# Patient Record
Sex: MALE | Race: ASIAN | NOT HISPANIC OR LATINO | Employment: FULL TIME | URBAN - METROPOLITAN AREA
[De-identification: names, ages, dates, MRNs, and addresses within clinical notes are randomized per-mention and may not be internally consistent; named-entity substitution may affect disease eponyms.]

---

## 2017-06-28 ENCOUNTER — GENERIC CONVERSION - ENCOUNTER (OUTPATIENT)
Dept: OTHER | Facility: OTHER | Age: 32
End: 2017-06-28

## 2017-06-28 ENCOUNTER — HOSPITAL ENCOUNTER (INPATIENT)
Facility: HOSPITAL | Age: 32
LOS: 2 days | Discharge: HOME | DRG: 025 | End: 2017-06-30
Attending: GENERAL PRACTICE | Admitting: GENERAL PRACTICE
Payer: OTHER MISCELLANEOUS

## 2017-06-28 ENCOUNTER — APPOINTMENT (EMERGENCY)
Dept: CT IMAGING | Facility: HOSPITAL | Age: 32
DRG: 025 | End: 2017-06-28
Payer: OTHER MISCELLANEOUS

## 2017-06-28 ENCOUNTER — APPOINTMENT (EMERGENCY)
Dept: RADIOLOGY | Facility: HOSPITAL | Age: 32
DRG: 025 | End: 2017-06-28
Payer: OTHER MISCELLANEOUS

## 2017-06-28 DIAGNOSIS — G44.309 HEADACHE DUE TO OLD CONCUSSION (HCC): ICD-10-CM

## 2017-06-28 DIAGNOSIS — R55 SYNCOPE, UNSPECIFIED SYNCOPE TYPE: ICD-10-CM

## 2017-06-28 DIAGNOSIS — R55 SYNCOPE: Primary | ICD-10-CM

## 2017-06-28 DIAGNOSIS — S06.0X9S HEADACHE DUE TO OLD CONCUSSION (HCC): ICD-10-CM

## 2017-06-28 PROBLEM — S06.0XAS HEADACHE DUE TO OLD CONCUSSION (HCC): Status: ACTIVE | Noted: 2017-06-28

## 2017-06-28 PROBLEM — S30.0XXA CONTUSION OF LOWER BACK: Status: ACTIVE | Noted: 2017-06-28

## 2017-06-28 LAB
ALBUMIN SERPL BCP-MCNC: 3.9 G/DL (ref 3.5–5)
ALP SERPL-CCNC: 64 U/L (ref 46–116)
ALT SERPL W P-5'-P-CCNC: 96 U/L (ref 12–78)
ANION GAP SERPL CALCULATED.3IONS-SCNC: 6 MMOL/L (ref 4–13)
AST SERPL W P-5'-P-CCNC: 55 U/L (ref 5–45)
BASOPHILS # BLD AUTO: 0.05 THOUSANDS/ΜL (ref 0–0.1)
BASOPHILS NFR BLD AUTO: 1 % (ref 0–1)
BILIRUB SERPL-MCNC: 0.2 MG/DL (ref 0.2–1)
BUN SERPL-MCNC: 15 MG/DL (ref 5–25)
CALCIUM SERPL-MCNC: 9.1 MG/DL (ref 8.3–10.1)
CHLORIDE SERPL-SCNC: 103 MMOL/L (ref 100–108)
CO2 SERPL-SCNC: 32 MMOL/L (ref 21–32)
CREAT SERPL-MCNC: 0.94 MG/DL (ref 0.6–1.3)
EOSINOPHIL # BLD AUTO: 0.22 THOUSAND/ΜL (ref 0–0.61)
EOSINOPHIL NFR BLD AUTO: 3 % (ref 0–6)
ERYTHROCYTE [DISTWIDTH] IN BLOOD BY AUTOMATED COUNT: 14.2 % (ref 11.6–15.1)
GFR SERPL CREATININE-BSD FRML MDRD: >60 ML/MIN/1.73SQ M
GLUCOSE SERPL-MCNC: 101 MG/DL (ref 65–140)
HCT VFR BLD AUTO: 43.1 % (ref 36.5–49.3)
HGB BLD-MCNC: 13.9 G/DL (ref 12–17)
LYMPHOCYTES # BLD AUTO: 2.14 THOUSANDS/ΜL (ref 0.6–4.47)
LYMPHOCYTES NFR BLD AUTO: 27 % (ref 14–44)
MCH RBC QN AUTO: 25.9 PG (ref 26.8–34.3)
MCHC RBC AUTO-ENTMCNC: 32.3 G/DL (ref 31.4–37.4)
MCV RBC AUTO: 80 FL (ref 82–98)
MONOCYTES # BLD AUTO: 0.56 THOUSAND/ΜL (ref 0.17–1.22)
MONOCYTES NFR BLD AUTO: 7 % (ref 4–12)
NEUTROPHILS # BLD AUTO: 4.97 THOUSANDS/ΜL (ref 1.85–7.62)
NEUTS SEG NFR BLD AUTO: 62 % (ref 43–75)
NRBC BLD AUTO-RTO: 0 /100 WBCS
PLATELET # BLD AUTO: 277 THOUSANDS/UL (ref 149–390)
PMV BLD AUTO: 9.5 FL (ref 8.9–12.7)
POTASSIUM SERPL-SCNC: 4.1 MMOL/L (ref 3.5–5.3)
PROT SERPL-MCNC: 7.6 G/DL (ref 6.4–8.2)
RBC # BLD AUTO: 5.37 MILLION/UL (ref 3.88–5.62)
SODIUM SERPL-SCNC: 141 MMOL/L (ref 136–145)
TROPONIN I SERPL-MCNC: <0.02 NG/ML
WBC # BLD AUTO: 7.97 THOUSAND/UL (ref 4.31–10.16)

## 2017-06-28 PROCEDURE — 71020 HB CHEST X-RAY 2VW FRONTAL&LATL: CPT | Performed by: GENERAL PRACTICE

## 2017-06-28 PROCEDURE — 70496 CT ANGIOGRAPHY HEAD: CPT

## 2017-06-28 PROCEDURE — 80053 COMPREHEN METABOLIC PANEL: CPT | Performed by: GENERAL PRACTICE

## 2017-06-28 PROCEDURE — 84484 ASSAY OF TROPONIN QUANT: CPT | Performed by: GENERAL PRACTICE

## 2017-06-28 PROCEDURE — 36415 COLL VENOUS BLD VENIPUNCTURE: CPT

## 2017-06-28 PROCEDURE — 93005 ELECTROCARDIOGRAM TRACING: CPT | Performed by: GENERAL PRACTICE

## 2017-06-28 PROCEDURE — 96374 THER/PROPH/DIAG INJ IV PUSH: CPT

## 2017-06-28 PROCEDURE — 85025 COMPLETE CBC W/AUTO DIFF WBC: CPT | Performed by: GENERAL PRACTICE

## 2017-06-28 PROCEDURE — 93005 ELECTROCARDIOGRAM TRACING: CPT | Performed by: NURSE PRACTITIONER

## 2017-06-28 PROCEDURE — 70498 CT ANGIOGRAPHY NECK: CPT

## 2017-06-28 PROCEDURE — 74177 CT ABD & PELVIS W/CONTRAST: CPT

## 2017-06-28 RX ORDER — SODIUM CHLORIDE 9 MG/ML
75 INJECTION, SOLUTION INTRAVENOUS CONTINUOUS
Status: DISCONTINUED | OUTPATIENT
Start: 2017-06-28 | End: 2017-06-30

## 2017-06-28 RX ORDER — ONDANSETRON 2 MG/ML
4 INJECTION INTRAMUSCULAR; INTRAVENOUS ONCE
Status: COMPLETED | OUTPATIENT
Start: 2017-06-28 | End: 2017-06-28

## 2017-06-28 RX ORDER — ONDANSETRON 2 MG/ML
INJECTION INTRAMUSCULAR; INTRAVENOUS
Status: COMPLETED
Start: 2017-06-28 | End: 2017-06-28

## 2017-06-28 RX ORDER — ONDANSETRON 4 MG/1
4 TABLET, ORALLY DISINTEGRATING ORAL EVERY 8 HOURS PRN
Status: ON HOLD | COMMUNITY
End: 2017-06-30

## 2017-06-28 RX ORDER — ACETAMINOPHEN 325 MG/1
650 TABLET ORAL EVERY 6 HOURS PRN
COMMUNITY

## 2017-06-28 RX ORDER — ONDANSETRON 4 MG/1
4 TABLET, ORALLY DISINTEGRATING ORAL EVERY 8 HOURS PRN
Status: DISCONTINUED | OUTPATIENT
Start: 2017-06-28 | End: 2017-06-29

## 2017-06-28 RX ORDER — ACETAMINOPHEN 325 MG/1
650 TABLET ORAL EVERY 6 HOURS PRN
Status: DISCONTINUED | OUTPATIENT
Start: 2017-06-28 | End: 2017-06-29

## 2017-06-28 RX ORDER — ACETAMINOPHEN 325 MG/1
650 TABLET ORAL EVERY 6 HOURS PRN
Status: DISCONTINUED | OUTPATIENT
Start: 2017-06-28 | End: 2017-06-28

## 2017-06-28 RX ADMIN — ACETAMINOPHEN 650 MG: 325 TABLET ORAL at 21:02

## 2017-06-28 RX ADMIN — IOHEXOL 100 ML: 350 INJECTION, SOLUTION INTRAVENOUS at 15:49

## 2017-06-28 RX ADMIN — SODIUM CHLORIDE 75 ML/HR: 0.9 INJECTION, SOLUTION INTRAVENOUS at 21:04

## 2017-06-28 RX ADMIN — ONDANSETRON 4 MG: 2 INJECTION INTRAMUSCULAR; INTRAVENOUS at 21:02

## 2017-06-28 RX ADMIN — ONDANSETRON 4 MG: 2 INJECTION INTRAMUSCULAR; INTRAVENOUS at 16:09

## 2017-06-29 ENCOUNTER — APPOINTMENT (INPATIENT)
Dept: NON INVASIVE DIAGNOSTICS | Facility: HOSPITAL | Age: 32
DRG: 025 | End: 2017-06-29
Payer: OTHER MISCELLANEOUS

## 2017-06-29 ENCOUNTER — APPOINTMENT (INPATIENT)
Dept: MRI IMAGING | Facility: HOSPITAL | Age: 32
DRG: 025 | End: 2017-06-29
Payer: OTHER MISCELLANEOUS

## 2017-06-29 ENCOUNTER — GENERIC CONVERSION - ENCOUNTER (OUTPATIENT)
Dept: OTHER | Facility: OTHER | Age: 32
End: 2017-06-29

## 2017-06-29 ENCOUNTER — APPOINTMENT (INPATIENT)
Dept: NEUROLOGY | Facility: HOSPITAL | Age: 32
DRG: 025 | End: 2017-06-29
Attending: PSYCHIATRY & NEUROLOGY
Payer: OTHER MISCELLANEOUS

## 2017-06-29 LAB
ALBUMIN SERPL BCP-MCNC: 3.3 G/DL (ref 3.5–5)
ALP SERPL-CCNC: 56 U/L (ref 46–116)
ALT SERPL W P-5'-P-CCNC: 74 U/L (ref 12–78)
ANION GAP SERPL CALCULATED.3IONS-SCNC: 9 MMOL/L (ref 4–13)
AST SERPL W P-5'-P-CCNC: 38 U/L (ref 5–45)
ATRIAL RATE: 84 BPM
BILIRUB DIRECT SERPL-MCNC: 0.05 MG/DL (ref 0–0.2)
BILIRUB SERPL-MCNC: 0.2 MG/DL (ref 0.2–1)
BUN SERPL-MCNC: 10 MG/DL (ref 5–25)
CALCIUM SERPL-MCNC: 8.8 MG/DL (ref 8.3–10.1)
CHLORIDE SERPL-SCNC: 103 MMOL/L (ref 100–108)
CO2 SERPL-SCNC: 27 MMOL/L (ref 21–32)
CREAT SERPL-MCNC: 0.87 MG/DL (ref 0.6–1.3)
ERYTHROCYTE [DISTWIDTH] IN BLOOD BY AUTOMATED COUNT: 14.3 % (ref 11.6–15.1)
GFR SERPL CREATININE-BSD FRML MDRD: >60 ML/MIN/1.73SQ M
GLUCOSE SERPL-MCNC: 110 MG/DL (ref 65–140)
HCT VFR BLD AUTO: 40 % (ref 36.5–49.3)
HGB BLD-MCNC: 13 G/DL (ref 12–17)
MAGNESIUM SERPL-MCNC: 1.8 MG/DL (ref 1.6–2.6)
MCH RBC QN AUTO: 26 PG (ref 26.8–34.3)
MCHC RBC AUTO-ENTMCNC: 32.5 G/DL (ref 31.4–37.4)
MCV RBC AUTO: 80 FL (ref 82–98)
P AXIS: 64 DEGREES
PLATELET # BLD AUTO: 240 THOUSANDS/UL (ref 149–390)
PMV BLD AUTO: 9.5 FL (ref 8.9–12.7)
POTASSIUM SERPL-SCNC: 3.9 MMOL/L (ref 3.5–5.3)
PR INTERVAL: 164 MS
PROT SERPL-MCNC: 6.8 G/DL (ref 6.4–8.2)
QRS AXIS: 66 DEGREES
QRSD INTERVAL: 90 MS
QT INTERVAL: 352 MS
QTC INTERVAL: 415 MS
RBC # BLD AUTO: 5 MILLION/UL (ref 3.88–5.62)
SODIUM SERPL-SCNC: 139 MMOL/L (ref 136–145)
T WAVE AXIS: 44 DEGREES
TROPONIN I SERPL-MCNC: <0.02 NG/ML
TSH SERPL DL<=0.05 MIU/L-ACNC: 1.04 UIU/ML (ref 0.36–3.74)
VENTRICULAR RATE: 84 BPM
WBC # BLD AUTO: 4.69 THOUSAND/UL (ref 4.31–10.16)

## 2017-06-29 PROCEDURE — 83735 ASSAY OF MAGNESIUM: CPT | Performed by: NURSE PRACTITIONER

## 2017-06-29 PROCEDURE — 80048 BASIC METABOLIC PNL TOTAL CA: CPT | Performed by: NURSE PRACTITIONER

## 2017-06-29 PROCEDURE — 84484 ASSAY OF TROPONIN QUANT: CPT | Performed by: PHYSICIAN ASSISTANT

## 2017-06-29 PROCEDURE — 93306 TTE W/DOPPLER COMPLETE: CPT

## 2017-06-29 PROCEDURE — 70551 MRI BRAIN STEM W/O DYE: CPT

## 2017-06-29 PROCEDURE — 80076 HEPATIC FUNCTION PANEL: CPT | Performed by: GENERAL PRACTICE

## 2017-06-29 PROCEDURE — 95816 EEG AWAKE AND DROWSY: CPT

## 2017-06-29 PROCEDURE — 84443 ASSAY THYROID STIM HORMONE: CPT | Performed by: NURSE PRACTITIONER

## 2017-06-29 PROCEDURE — 85027 COMPLETE CBC AUTOMATED: CPT | Performed by: NURSE PRACTITIONER

## 2017-06-29 PROCEDURE — 36415 COLL VENOUS BLD VENIPUNCTURE: CPT | Performed by: NURSE PRACTITIONER

## 2017-06-29 PROCEDURE — 99285 EMERGENCY DEPT VISIT HI MDM: CPT

## 2017-06-29 RX ORDER — ONDANSETRON 4 MG/1
4 TABLET, ORALLY DISINTEGRATING ORAL EVERY 6 HOURS PRN
Status: DISCONTINUED | OUTPATIENT
Start: 2017-06-29 | End: 2017-06-30 | Stop reason: HOSPADM

## 2017-06-29 RX ORDER — MORPHINE SULFATE 2 MG/ML
1 INJECTION, SOLUTION INTRAMUSCULAR; INTRAVENOUS EVERY 4 HOURS PRN
Status: DISCONTINUED | OUTPATIENT
Start: 2017-06-29 | End: 2017-06-30 | Stop reason: HOSPADM

## 2017-06-29 RX ORDER — BUTALBITAL, ACETAMINOPHEN AND CAFFEINE 50; 325; 40 MG/1; MG/1; MG/1
1 TABLET ORAL ONCE
Status: COMPLETED | OUTPATIENT
Start: 2017-06-29 | End: 2017-06-29

## 2017-06-29 RX ADMIN — ENOXAPARIN SODIUM 40 MG: 40 INJECTION SUBCUTANEOUS at 09:29

## 2017-06-29 RX ADMIN — ONDANSETRON 4 MG: 4 TABLET, ORALLY DISINTEGRATING ORAL at 23:20

## 2017-06-29 RX ADMIN — BUTALBITAL, ACETAMINOPHEN, AND CAFFEINE 1 TABLET: 50; 325; 40 TABLET ORAL at 06:18

## 2017-06-29 RX ADMIN — ONDANSETRON 4 MG: 4 TABLET, ORALLY DISINTEGRATING ORAL at 15:46

## 2017-06-29 RX ADMIN — MORPHINE SULFATE 1 MG: 2 INJECTION, SOLUTION INTRAMUSCULAR; INTRAVENOUS at 20:17

## 2017-06-29 RX ADMIN — MORPHINE SULFATE 1 MG: 2 INJECTION, SOLUTION INTRAMUSCULAR; INTRAVENOUS at 11:47

## 2017-06-29 RX ADMIN — SODIUM CHLORIDE 75 ML/HR: 0.9 INJECTION, SOLUTION INTRAVENOUS at 23:19

## 2017-06-29 RX ADMIN — MORPHINE SULFATE 1 MG: 2 INJECTION, SOLUTION INTRAMUSCULAR; INTRAVENOUS at 16:30

## 2017-06-29 RX ADMIN — ONDANSETRON 4 MG: 4 TABLET, ORALLY DISINTEGRATING ORAL at 09:39

## 2017-06-30 ENCOUNTER — APPOINTMENT (INPATIENT)
Dept: NUCLEAR MEDICINE | Facility: HOSPITAL | Age: 32
DRG: 025 | End: 2017-06-30
Payer: OTHER MISCELLANEOUS

## 2017-06-30 ENCOUNTER — APPOINTMENT (INPATIENT)
Dept: NON INVASIVE DIAGNOSTICS | Facility: HOSPITAL | Age: 32
DRG: 025 | End: 2017-06-30
Payer: OTHER MISCELLANEOUS

## 2017-06-30 ENCOUNTER — GENERIC CONVERSION - ENCOUNTER (OUTPATIENT)
Dept: OTHER | Facility: OTHER | Age: 32
End: 2017-06-30

## 2017-06-30 VITALS
WEIGHT: 220 LBS | HEIGHT: 70 IN | TEMPERATURE: 97.9 F | SYSTOLIC BLOOD PRESSURE: 133 MMHG | HEART RATE: 88 BPM | RESPIRATION RATE: 17 BRPM | DIASTOLIC BLOOD PRESSURE: 92 MMHG | OXYGEN SATURATION: 95 % | BODY MASS INDEX: 31.5 KG/M2

## 2017-06-30 LAB
MAX DIASTOLIC BP: 80 MMHG
MAX HEART RATE: 122 BPM
MAX PREDICTED HEART RATE: 188 BPM
MAX. SYSTOLIC BP: 124 MMHG
PROTOCOL NAME: NORMAL
REASON FOR TERMINATION: NORMAL
TARGET HR FORMULA: NORMAL
TEST INDICATION: NORMAL
TIME IN EXERCISE PHASE: 180 S

## 2017-06-30 PROCEDURE — 78452 HT MUSCLE IMAGE SPECT MULT: CPT

## 2017-06-30 PROCEDURE — A9502 TC99M TETROFOSMIN: HCPCS

## 2017-06-30 PROCEDURE — 93017 CV STRESS TEST TRACING ONLY: CPT

## 2017-06-30 RX ORDER — SUMATRIPTAN 100 MG/1
100 TABLET, FILM COATED ORAL ONCE AS NEEDED
Qty: 10 TABLET | Refills: 0 | Status: SHIPPED | OUTPATIENT
Start: 2017-06-30 | End: 2018-09-11 | Stop reason: HOSPADM

## 2017-06-30 RX ORDER — SUMATRIPTAN 25 MG/1
100 TABLET, FILM COATED ORAL ONCE AS NEEDED
Status: COMPLETED | OUTPATIENT
Start: 2017-06-30 | End: 2017-06-30

## 2017-06-30 RX ORDER — BUTALBITAL, ACETAMINOPHEN AND CAFFEINE 50; 325; 40 MG/1; MG/1; MG/1
1 TABLET ORAL EVERY 6 HOURS PRN
Qty: 10 TABLET | Refills: 0 | Status: SHIPPED | OUTPATIENT
Start: 2017-06-30 | End: 2018-09-11 | Stop reason: HOSPADM

## 2017-06-30 RX ORDER — ONDANSETRON 4 MG/1
4 TABLET, ORALLY DISINTEGRATING ORAL EVERY 8 HOURS PRN
Qty: 20 TABLET | Refills: 0 | Status: SHIPPED | OUTPATIENT
Start: 2017-06-30 | End: 2018-09-11 | Stop reason: HOSPADM

## 2017-06-30 RX ADMIN — MORPHINE SULFATE 1 MG: 2 INJECTION, SOLUTION INTRAMUSCULAR; INTRAVENOUS at 07:37

## 2017-06-30 RX ADMIN — SODIUM CHLORIDE 75 ML/HR: 0.9 INJECTION, SOLUTION INTRAVENOUS at 12:20

## 2017-06-30 RX ADMIN — MORPHINE SULFATE 1 MG: 2 INJECTION, SOLUTION INTRAMUSCULAR; INTRAVENOUS at 01:33

## 2017-06-30 RX ADMIN — ENOXAPARIN SODIUM 40 MG: 40 INJECTION SUBCUTANEOUS at 12:21

## 2017-06-30 RX ADMIN — SUMATRIPTAN SUCCINATE 100 MG: 25 TABLET, FILM COATED ORAL at 12:20

## 2017-06-30 RX ADMIN — REGADENOSON 0.4 MG: 0.08 INJECTION, SOLUTION INTRAVENOUS at 10:01

## 2017-06-30 RX ADMIN — MORPHINE SULFATE 1 MG: 2 INJECTION, SOLUTION INTRAMUSCULAR; INTRAVENOUS at 12:20

## 2017-07-06 ENCOUNTER — TRANSCRIBE ORDERS (OUTPATIENT)
Dept: ADMINISTRATIVE | Facility: HOSPITAL | Age: 32
End: 2017-07-06

## 2017-07-06 ENCOUNTER — ALLSCRIPTS OFFICE VISIT (OUTPATIENT)
Dept: OTHER | Facility: OTHER | Age: 32
End: 2017-07-06

## 2017-07-06 DIAGNOSIS — M54.12 BRACHIAL NEURITIS OR RADICULITIS NOS: Primary | ICD-10-CM

## 2017-07-06 DIAGNOSIS — R55 SYNCOPE AND COLLAPSE: ICD-10-CM

## 2017-07-06 DIAGNOSIS — F07.81 POSTCONCUSSIONAL SYNDROME: ICD-10-CM

## 2017-07-06 DIAGNOSIS — M54.12 RADICULOPATHY OF CERVICAL REGION: ICD-10-CM

## 2017-07-06 DIAGNOSIS — R55 SYNCOPE AND COLLAPSE: Primary | ICD-10-CM

## 2017-07-06 DIAGNOSIS — M54.16 RADICULOPATHY OF LUMBAR REGION: ICD-10-CM

## 2017-07-10 ENCOUNTER — ALLSCRIPTS OFFICE VISIT (OUTPATIENT)
Dept: OTHER | Facility: OTHER | Age: 32
End: 2017-07-10

## 2017-07-12 ENCOUNTER — TRANSCRIBE ORDERS (OUTPATIENT)
Dept: ADMINISTRATIVE | Facility: HOSPITAL | Age: 32
End: 2017-07-12

## 2017-07-12 DIAGNOSIS — M54.12 BRACHIAL NEURITIS OR RADICULITIS NOS: Primary | ICD-10-CM

## 2017-07-13 ENCOUNTER — GENERIC CONVERSION - ENCOUNTER (OUTPATIENT)
Dept: OTHER | Facility: OTHER | Age: 32
End: 2017-07-13

## 2017-07-20 ENCOUNTER — ALLSCRIPTS OFFICE VISIT (OUTPATIENT)
Dept: OTHER | Facility: OTHER | Age: 32
End: 2017-07-20

## 2017-07-21 ENCOUNTER — HOSPITAL ENCOUNTER (OUTPATIENT)
Dept: MRI IMAGING | Facility: HOSPITAL | Age: 32
Discharge: HOME | End: 2017-07-21
Attending: PSYCHIATRY & NEUROLOGY
Payer: OTHER MISCELLANEOUS

## 2017-07-21 DIAGNOSIS — M54.12 RADICULOPATHY OF CERVICAL REGION: ICD-10-CM

## 2017-07-21 DIAGNOSIS — M54.16 RADICULOPATHY OF LUMBAR REGION: ICD-10-CM

## 2017-07-21 PROCEDURE — 72148 MRI LUMBAR SPINE W/O DYE: CPT

## 2017-07-21 PROCEDURE — 72141 MRI NECK SPINE W/O DYE: CPT

## 2017-07-24 ENCOUNTER — HOSPITAL ENCOUNTER (OUTPATIENT)
Dept: NON INVASIVE DIAGNOSTICS | Facility: CLINIC | Age: 32
Discharge: HOME | End: 2017-07-24
Payer: COMMERCIAL

## 2017-07-24 DIAGNOSIS — R55 SYNCOPE AND COLLAPSE: ICD-10-CM

## 2017-08-08 ENCOUNTER — HOSPITAL ENCOUNTER (OUTPATIENT)
Dept: NEUROLOGY | Facility: HOSPITAL | Age: 32
Discharge: HOME | End: 2017-08-08
Attending: PSYCHIATRY & NEUROLOGY
Payer: OTHER MISCELLANEOUS

## 2017-08-08 DIAGNOSIS — R55 SYNCOPE, UNSPECIFIED SYNCOPE TYPE: ICD-10-CM

## 2017-08-08 DIAGNOSIS — R55 SYNCOPE AND COLLAPSE: ICD-10-CM

## 2017-08-08 PROCEDURE — 95819 EEG AWAKE AND ASLEEP: CPT

## 2017-08-24 ENCOUNTER — ALLSCRIPTS OFFICE VISIT (OUTPATIENT)
Dept: OTHER | Facility: OTHER | Age: 32
End: 2017-08-24

## 2017-08-28 ENCOUNTER — ALLSCRIPTS OFFICE VISIT (OUTPATIENT)
Dept: OTHER | Facility: OTHER | Age: 32
End: 2017-08-28

## 2017-08-31 ENCOUNTER — GENERIC CONVERSION - ENCOUNTER (OUTPATIENT)
Dept: OTHER | Facility: OTHER | Age: 32
End: 2017-08-31

## 2017-09-01 ENCOUNTER — ALLSCRIPTS OFFICE VISIT (OUTPATIENT)
Dept: OTHER | Facility: OTHER | Age: 32
End: 2017-09-01

## 2017-09-01 DIAGNOSIS — Z00.00 ENCOUNTER FOR GENERAL ADULT MEDICAL EXAMINATION WITHOUT ABNORMAL FINDINGS: ICD-10-CM

## 2017-09-01 DIAGNOSIS — R55 SYNCOPE AND COLLAPSE: ICD-10-CM

## 2017-09-13 ENCOUNTER — APPOINTMENT (OUTPATIENT)
Dept: LAB | Facility: HOSPITAL | Age: 32
End: 2017-09-13
Attending: PSYCHIATRY & NEUROLOGY
Payer: OTHER MISCELLANEOUS

## 2017-09-13 ENCOUNTER — TRANSCRIBE ORDERS (OUTPATIENT)
Dept: ADMINISTRATIVE | Facility: HOSPITAL | Age: 32
End: 2017-09-13

## 2017-09-13 DIAGNOSIS — R55 SYNCOPE AND COLLAPSE: ICD-10-CM

## 2017-09-13 LAB
ALBUMIN SERPL BCP-MCNC: 3.8 G/DL (ref 3.5–5)
ALP SERPL-CCNC: 55 U/L (ref 46–116)
ALT SERPL W P-5'-P-CCNC: 28 U/L (ref 12–78)
ANION GAP SERPL CALCULATED.3IONS-SCNC: 6 MMOL/L (ref 4–13)
AST SERPL W P-5'-P-CCNC: 11 U/L (ref 5–45)
BASOPHILS # BLD AUTO: 0.05 THOUSANDS/ΜL (ref 0–0.1)
BASOPHILS NFR BLD AUTO: 1 % (ref 0–1)
BILIRUB SERPL-MCNC: 0.2 MG/DL (ref 0.2–1)
BUN SERPL-MCNC: 10 MG/DL (ref 5–25)
CALCIUM SERPL-MCNC: 8.7 MG/DL (ref 8.3–10.1)
CHLORIDE SERPL-SCNC: 102 MMOL/L (ref 100–108)
CO2 SERPL-SCNC: 30 MMOL/L (ref 21–32)
CREAT SERPL-MCNC: 0.92 MG/DL (ref 0.6–1.3)
EOSINOPHIL # BLD AUTO: 0.18 THOUSAND/ΜL (ref 0–0.61)
EOSINOPHIL NFR BLD AUTO: 3 % (ref 0–6)
ERYTHROCYTE [DISTWIDTH] IN BLOOD BY AUTOMATED COUNT: 14.3 % (ref 11.6–15.1)
GFR SERPL CREATININE-BSD FRML MDRD: 110 ML/MIN/1.73SQ M
GLUCOSE P FAST SERPL-MCNC: 106 MG/DL (ref 65–99)
HCT VFR BLD AUTO: 42.9 % (ref 36.5–49.3)
HGB BLD-MCNC: 13.8 G/DL (ref 12–17)
LYMPHOCYTES # BLD AUTO: 2.18 THOUSANDS/ΜL (ref 0.6–4.47)
LYMPHOCYTES NFR BLD AUTO: 41 % (ref 14–44)
MCH RBC QN AUTO: 26.2 PG (ref 26.8–34.3)
MCHC RBC AUTO-ENTMCNC: 32.2 G/DL (ref 31.4–37.4)
MCV RBC AUTO: 82 FL (ref 82–98)
MONOCYTES # BLD AUTO: 0.32 THOUSAND/ΜL (ref 0.17–1.22)
MONOCYTES NFR BLD AUTO: 6 % (ref 4–12)
NEUTROPHILS # BLD AUTO: 2.52 THOUSANDS/ΜL (ref 1.85–7.62)
NEUTS SEG NFR BLD AUTO: 48 % (ref 43–75)
NRBC BLD AUTO-RTO: 0 /100 WBCS
PLATELET # BLD AUTO: 288 THOUSANDS/UL (ref 149–390)
PMV BLD AUTO: 9.2 FL (ref 8.9–12.7)
POTASSIUM SERPL-SCNC: 4 MMOL/L (ref 3.5–5.3)
PROT SERPL-MCNC: 7.9 G/DL (ref 6.4–8.2)
RBC # BLD AUTO: 5.26 MILLION/UL (ref 3.88–5.62)
SODIUM SERPL-SCNC: 138 MMOL/L (ref 136–145)
WBC # BLD AUTO: 5.26 THOUSAND/UL (ref 4.31–10.16)

## 2017-09-13 PROCEDURE — 85025 COMPLETE CBC W/AUTO DIFF WBC: CPT

## 2017-09-13 PROCEDURE — 80053 COMPREHEN METABOLIC PANEL: CPT

## 2017-09-13 PROCEDURE — 36415 COLL VENOUS BLD VENIPUNCTURE: CPT

## 2017-09-20 ENCOUNTER — APPOINTMENT (OUTPATIENT)
Dept: RADIOLOGY | Facility: CLINIC | Age: 32
End: 2017-09-20
Payer: COMMERCIAL

## 2017-09-20 ENCOUNTER — ALLSCRIPTS OFFICE VISIT (OUTPATIENT)
Dept: OTHER | Facility: OTHER | Age: 32
End: 2017-09-20

## 2017-09-20 DIAGNOSIS — Z00.00 ENCOUNTER FOR GENERAL ADULT MEDICAL EXAMINATION WITHOUT ABNORMAL FINDINGS: ICD-10-CM

## 2017-09-20 PROCEDURE — 73030 X-RAY EXAM OF SHOULDER: CPT

## 2017-09-21 ENCOUNTER — ALLSCRIPTS OFFICE VISIT (OUTPATIENT)
Dept: RADIOLOGY | Facility: CLINIC | Age: 32
End: 2017-09-21
Payer: OTHER MISCELLANEOUS

## 2017-10-05 ENCOUNTER — GENERIC CONVERSION - ENCOUNTER (OUTPATIENT)
Dept: OTHER | Facility: OTHER | Age: 32
End: 2017-10-05

## 2017-10-06 ENCOUNTER — GENERIC CONVERSION - ENCOUNTER (OUTPATIENT)
Dept: OTHER | Facility: OTHER | Age: 32
End: 2017-10-06

## 2017-10-06 DIAGNOSIS — M54.12 RADICULOPATHY OF CERVICAL REGION: ICD-10-CM

## 2017-10-12 ENCOUNTER — ALLSCRIPTS OFFICE VISIT (OUTPATIENT)
Dept: RADIOLOGY | Facility: CLINIC | Age: 32
End: 2017-10-12
Payer: OTHER MISCELLANEOUS

## 2017-10-16 ENCOUNTER — GENERIC CONVERSION - ENCOUNTER (OUTPATIENT)
Dept: OTHER | Facility: OTHER | Age: 32
End: 2017-10-16

## 2017-10-17 NOTE — PROGRESS NOTES
Assessment  1  Cervical radiculopathy (723 4) (M54 12)   2  Cervical pain (723 1) (M54 2)    Plan   Cervical pain    · Follow-up PRN Evaluation and Treatment  Follow-up  Status: Complete  Done:  [id number]    * XR SHOULDER 2+ VIEW LEFT; Status:Resulted - Requires Verification,Retrospective By Protocol Authorization;   Done: 65TSN9139 12:00AM  Order Comments:WR to room 4; Due:41Ape0871; Ordered;    For:Health Maintenance; Ordered By:Palak Hansen;   Cervical radiculopathy (723 4) (M54 12)          Discussion/Summary    Patient's left shoulder exam is essentially normal  There does not seem to be a focal shoulder problem  He does have pain in the trapezius musculature at that is most likely secondary to his cervical strain and neck pain  He is in pain management being treated for that  He can follow up with me on a p r n  basis  History of Present Illness  Social 27-year-old gentleman who presents for evaluation of his left shoulder  He had a fall of 10 feet from a ladder on 6/14/2017  He was evaluated in an emergency room and determined to have a concussion  No other significant injury  2 weeks after that he had a syncopal episode and fell down a flight of stairs  Again seen in emergency room  This time at [facility]  He was noted to have a lumbar sprain  No other significant injury  Since then he has been followed by neurology, cardiology and pain management at [facility]  He suffers from neck pain and back pain and cervical lumbar radiculopathies as well as postconcussive syndrome  He did complain of left shoulder area pain at his last neurology appointment and is referred here for evaluation of the left shoulder  Review of Systems    Constitutional: No fever or chills, feels well, no tiredness, no recent weight loss or weight gain  Eyes: No complaints of red eyes, no eyesight problems  ENT: no complaints of loss of hearing, no nosebleeds, no sore throat     Cardiovascular: chest pain, but-- no intermittent leg claudication,-- no palpitations-- and-- no lower extremity edema  Respiratory: No complaints of shortness of breath, no wheezing, no cough  Gastrointestinal: No complaints of abdominal pain, no constipation, no nausea or vomiting, no diarrhea or bloody stools  Genitourinary: No complaints of dysuria or incontinence, no hesitancy, no nocturia  Musculoskeletal: as noted in HPI  Neurological: headache-- and-- tingling, but-- no numbness,-- no confusion-- and-- no dizziness  Psychiatric: No suicidal thoughts, no anxiety, no depression  Endocrine: No muscle weakness, no frequent urination, no excessive thirst, no feelings of weakness  ROS reviewed  Active Problems   1  Abdominal pain, LLQ (left lower quadrant) (789 04) (R10 32)   2  BRBPR (bright red blood per rectum) (569 3) (K62 5)   3  Cervical pain (723 1) (M54 2)   4  Left shoulder pain (719 41) (M25 512)   5  Post concussion syndrome (310 2) (F07 81)   6  Syncope (780 2) (R55)    Cervical radiculopathy (723 4) (M54 12)       Lumbar radiculopathy (724 4) (M54 16)          Past Medical History   · History of Left shoulder pain (719 41) (M25 512)   · No pertinent past medical history    The active problems and past medical history were reviewed and updated today  Surgical History   · History of Open Treatment Of A Fracture Of A Sesamoid Bone Of The Foot    The surgical history was reviewed and updated today  Family History  Father    · Family history of cerebrovascular accident (CVA) (V17 1) (Z82 3)   · Family history of coronary artery disease (V17 3) (Z82 49)   · Family history of hypertension (V17 49) (Z82 49)   · Family history of myocardial infarction (V17 3) (Z82 49)    The family history was reviewed and updated today  Social History   · Former smoker (Z05 70) (P64 146)   · Full-time employment   · No alcohol use   · No drug use   · One child   · History of Social alcohol use (Z78 9)    Current Meds   1  No Reported Medications  Requested for: 20Sep2017 Recorded    The medication list was reviewed and updated today  Allergies  1  No Known Drug Allergies  2  No Known Environmental Allergies   3  No Known Food Allergies    Vitals   Recorded: 62DNX3000 03:12PM   Heart Rate 68   Systolic 131, LUE, Sitting   Diastolic 70, LUE, Sitting   Height 5 ft 10 in   Weight 232 lb    BMI Calculated 33 29   BSA Calculated 2 22     Physical Exam      Cervical Spine:   Appearance: Normal     Tenderness: None except as noted:   left paraspinal at level -- and-- left trapezius muscle  ROM: Full  Motor: Normal     trapexial ROM: equivalent both sides  Motor: Normal  Special Tests: positive Cross Body Adduction test, but-- negative Painful Arc,-- negative Fung test,-- negative Neer test,-- negative Drop Arm test,-- negative Lift Off test,-- negative Sulcus sign,-- negative Anterior Apprehension test,-- negative Yergason's test,-- negative Speed's test,-- negative Anterior Load and Shift,-- negative Posterior Load and Shift-- and-- negative Posterior Apprehension test  Left Shoulder Appearance[de-identified] Normal  Tenderness: None except the    Constitutional - General appearance: Normal    Musculoskeletal - Gait and station: Normal -- Digits and nails: Normal -- Muscle strength/tone: Normal    Cardiovascular - Pulses: Normal -- Examination of extremities for edema and/or varicosities: Normal    Skin - Skin and subcutaneous tissue: Normal    Neurologic - Sensation: Normal    Psychiatric - Orientation to person, place, and time: Normal -- Mood and affect: Normal    Eyes   Conjunctiva and lids: Normal     Pupils and irises: Normal        Results/Data    Views: AP view with exeternal rotation, scapular Y view and AP view with internal rotation of the left shoulder  Findings: no fracture,-- no dislocation,-- no bony lesions,-- the soft tissues were normal-- and-- normal joint spaces        Future Appointments    Date/Time Provider Specialty Site   12/11/2017 09:20 AM Marycarmen Chávez MD Neurology [facility]     Signatures   Electronically signed by : MANE Qiu ; Oct 16 2017  6:03AM EST                       (Author)

## 2017-10-19 ENCOUNTER — GENERIC CONVERSION - ENCOUNTER (OUTPATIENT)
Dept: OTHER | Facility: OTHER | Age: 32
End: 2017-10-19

## 2017-10-24 NOTE — PROGRESS NOTES
Assessment  Assessed    1  Syncope (780 2) (R55)    Plan  Lumbar radiculopathy    · Gabapentin 100 MG Oral Capsule (Neurontin)   Rx By: Mee Toro; Dispense: 0 Days ; #:30 Capsule; Refill: 1;For: Lumbar radiculopathy; KATHARINE = N; Verified Transmission to [id number] Msg to Pharmacy: Please make sure no contraindication or drug interaction; Last Updated By: Jemma Pendleton; 8/24/2017 10:42:40 AM  Unlinked    · Acetaminophen 325 MG Oral Tablet   Dispense: 0 Days ; #:0 Tablet; Refill: 0; KATHARINE = N; Record; Last Updated By: Jemma Pendleton; 8/24/2017 10:42:47 AM   · Butalbital-APAP-Caffeine -40 MG Oral Tablet   Dispense: 0 Days ; #:0 TABS; Refill: 0; KATHARINE = N; Record; Last Updated By: Jemma Pendleton; 8/24/2017 10:42:44 AM   · SUMAtriptan Succinate 100 MG Oral Tablet   Dispense: 0 Days ; #:0 Tablet; Refill: 0; KATHARINE = N; Record; Last Updated By: Jemma Pendleton; 8/24/2017 10:42:37 AM    Discussion/Summary  Cardiology Discussion Summary Free Text Note Form [facility]:   2-D echocardiogram and pharmacological nuclear stress tests reports from the hospital were normal monitor August 2017 showed sinus rhythm and sinus tachycardia with IVCD with no significant pauses  asked to keep himself well hydrated all the time  aggressive risk factor modification and therapeutic lifestyle changes  Low calorie, low fat, low cholesterol diet with regular exercise and to optimize weight concepts of syncope, including signs and symptoms of cardiac disease  studies were reviewed  measures were reviewed  were entertained and answered  was advised to report any problem requiring medical attention  up with appropriate specialists and lab work as discussed  for follow up visit as scheduled or earlier, if needed  you for allowing me to participate in the care and evaluation of your patient  Should you have any questions, please feel free to contact me  Goals and Barriers: The patient has the current Goals: Symptom free   The patent has the current Barriers: None  Patient's Capacity to Self-Care: Patient is able to Self-Care  Medication SE Review and Pt Understands Tx: Possible side effects of new medications were reviewed with the patient/guardian today  The treatment plan was reviewed with the patient/guardian  The patient/guardian understands and agrees with the treatment plan       Counseling Documentation With Imm: The patient, patient's family was counseled regarding diagnostic results,-- instructions for management,-- risk factor reductions,-- prognosis,-- patient and family education,-- risks and benefits of treatment options,-- importance of compliance with treatment  Chief Complaint  Chief Complaint Free Text Note Form: Follow-up to recent hospitalization at [facility]      History of Present Illness  Cardiology HPI Free Text Note Form [facility]: Patient here as a follow-up to recent hospitalization in [facility] for a fall and post concussions syndrome and syncope  2-D echocardiogram and pharmacological nuclear stress test done in the hospital were normal  Patient denies any more episodes of lightheadedness or syncope  Also denies chest pain/pressure/tightness, SOB, palpitations, swelling feet, orthopnea, PND, claudication      Review of Systems  Cardiology Male ROS:     Cardiac: No complaints of chest pain, no palpitations, no fainiting  Skin: No complaints of nonhealing sores or skin rash  Genitourinary: No complaints of recurrent urinary tract infections, frequent urination at night, difficult urination, blood in urine, kidney stones, loss of bladder control, no kidney or prostate problems, no erectile dysfunction  Psychological: No complaints of feeling depressed, anxiety, panic attacks, or difficulty concentrating  General: No complaints of trouble sleeping, lack of energy, fatigue, appetite changes, weight changes, fever, frequent infections, or night sweats     Respiratory: No complaints of shortness of breath, cough with sputum, or wheezing  HEENT: No complaints of serious problems, hearing problems, nose problems, throat problems, or snoring  Gastrointestinal: No complaints of liver problems, nausea, vomiting, heartburn, constipation, bloody stools, diarrhea, problems swallowing, adbominal pain, or rectal bleeding  Hematologic: No complaints of bleeding disorders, anemia, blood clots, or excessive brusing  Neurological: as noted in HPI   Musculoskeletal: No complaints of arthritis, back pain, or painfull swelling  ROS Reviewed:   ROS reviewed  Active Problems  Problems    1  Abdominal pain, LLQ (left lower quadrant) (789 04) (R10 32)   2  BRBPR (bright red blood per rectum) (569 3) (K62 5)   3  Cervical pain (723 1) (M54 2)   4  Cervical radiculopathy (723 4) (M54 12)   5  Lumbar radiculopathy (724 4) (M54 16)   6  Post concussion syndrome (310 2) (F07 81)   7  Syncope (780 2) (R55)    Surgical History  Problems    1  History of Open Treatment Of A Fracture Of A Sesamoid Bone Of The Foot  Surgical History Reviewed: The surgical history was reviewed and updated today  Family History  Father    1  Family history of cerebrovascular accident (CVA) (V17 1) (Z82 3)   2  Family history of coronary artery disease (V17 3) (Z82 49)   3  Family history of hypertension (V17 49) (Z82 49)   4  Family history of myocardial infarction (V17 3) (Z82 49)  Family History Reviewed: The family history was reviewed and updated today  Social History  Problems    · Former smoker (D96 53) (K06 517)   · No drug use   · One child   · Social alcohol use (Z78 9)  Social History Reviewed: The social history was reviewed and updated today  The social history was reviewed and is unchanged  Current Meds   1  Acetaminophen 325 MG Oral Tablet; TAKE 2 TABLET Daily PRN; Therapy: (Recorded:19Dou9767) to Recorded   2  Butalbital-APAP-Caffeine -40 MG Oral Tablet; Therapy: (Recorded:58Jyv6322) to Recorded   3  Gabapentin 100 MG Oral Capsule; TAKE 1 CAPSULE AT BEDTIME; Therapy: 18TGI6915 to (Last Rx:83Its3169)  Requested for: 87QOC4463 Ordered   4  Ibuprofen 400 MG Oral Tablet; TAKE 1 TABLET 3 times daily; Therapy: (Recorded:50Nhe3314) to Recorded   5  SUMAtriptan Succinate 100 MG Oral Tablet; TAKE TABLET Daily; Therapy: (Recorded:32Iac3399) to Recorded  Medication List Reviewed: The medication list was reviewed and updated today  Allergies  Medication    1  No Known Drug Allergies    Vitals  Vital Signs    Recorded: 93Ylq7284 10:42AM   Heart Rate 92   Systolic 282   Diastolic 84   Height 5 ft 10 in   Weight 229 lb 4 96 oz   BMI Calculated 32 9   BSA Calculated 2 22   O2 Saturation 97     Physical Exam    Constitutional   General appearance: No acute distress, well appearing and well nourished  Eyes   Conjunctiva and Sclera examination: Conjunctiva pink, sclera anicteric  Ears, Nose, Mouth, and Throat - External inspection of ears and nose: Normal without deformities or discharge  -- Oropharynx: Clear, nares are clear, mucous membranes are moist    Neck   Neck and thyroid: Normal, supple, trachea midline, no thyromegaly  Pulmonary   Respiratory effort: No increased work of breathing or signs of respiratory distress  Auscultation of lungs: Clear to auscultation, no rales, no rhonchi, no wheezing, good air movement  Cardiovascular   Palpation of heart: Normal PMI, no thrills  Auscultation of heart: Normal rate and rhythm, normal S1 and S2, no murmurs  Carotid pulses: Normal, 2+ bilaterally  Peripheral vascular exam: Normal pulses throughout, no tenderness, erythema or swelling  Examination of extremities for edema and/or varicosities: Normal     Chest - Chest: Normal    Abdomen   Abdomen: Non-tender and no distention  Liver and spleen: No hepatomegaly or splenomegaly  Musculoskeletal Gait and station: Normal gait  -- Digits and nails: Normal without clubbing or cyanosis  -- Inspection/palpation of joints, bones, and muscles: Normal, ROM normal     Skin - Skin and subcutaneous tissue: Normal without rashes or lesions  Skin is warm and well perfused, normal turgor  Neurologic - Speech: Normal     Psychiatric - Orientation to person, place, and time: Normal -- Mood and affect: Normal       Health Management  BRBPR (bright red blood per rectum)   COLONOSCOPY; every 10 years; Last 02Jun2014; Next Due: 02Jun2024;  Active    Future Appointments    Date/Time Provider Specialty Site   08/28/2017 06:15 PM Yuniel Wilks MD Pain Management [facility]   09/01/2017 02:20 PM Trinh Colbert MD Neurology [facility]     Signatures   Electronically signed by : MANE Duke ; Aug 24 2017 11:05AM EST                       (Author)

## 2017-11-13 ENCOUNTER — GENERIC CONVERSION - ENCOUNTER (OUTPATIENT)
Dept: OTHER | Facility: OTHER | Age: 32
End: 2017-11-13

## 2017-11-28 ENCOUNTER — GENERIC CONVERSION - ENCOUNTER (OUTPATIENT)
Dept: OTHER | Facility: OTHER | Age: 32
End: 2017-11-28

## 2017-12-12 ENCOUNTER — ALLSCRIPTS OFFICE VISIT (OUTPATIENT)
Dept: RADIOLOGY | Facility: CLINIC | Age: 32
End: 2017-12-12
Payer: OTHER MISCELLANEOUS

## 2018-01-02 ENCOUNTER — GENERIC CONVERSION - ENCOUNTER (OUTPATIENT)
Dept: OTHER | Facility: OTHER | Age: 33
End: 2018-01-02

## 2018-01-10 ENCOUNTER — GENERIC CONVERSION - ENCOUNTER (OUTPATIENT)
Dept: OTHER | Facility: OTHER | Age: 33
End: 2018-01-10

## 2018-01-13 VITALS
HEIGHT: 70 IN | HEART RATE: 76 BPM | WEIGHT: 233 LBS | SYSTOLIC BLOOD PRESSURE: 116 MMHG | BODY MASS INDEX: 33.36 KG/M2 | TEMPERATURE: 98.4 F | DIASTOLIC BLOOD PRESSURE: 72 MMHG

## 2018-01-13 VITALS
DIASTOLIC BLOOD PRESSURE: 84 MMHG | HEART RATE: 92 BPM | SYSTOLIC BLOOD PRESSURE: 136 MMHG | OXYGEN SATURATION: 97 % | BODY MASS INDEX: 32.83 KG/M2 | HEIGHT: 70 IN | WEIGHT: 229.31 LBS

## 2018-01-13 VITALS
WEIGHT: 220 LBS | RESPIRATION RATE: 16 BRPM | HEIGHT: 70 IN | SYSTOLIC BLOOD PRESSURE: 118 MMHG | DIASTOLIC BLOOD PRESSURE: 94 MMHG | BODY MASS INDEX: 31.5 KG/M2 | HEART RATE: 86 BPM

## 2018-01-13 VITALS
HEIGHT: 70 IN | WEIGHT: 232 LBS | SYSTOLIC BLOOD PRESSURE: 134 MMHG | DIASTOLIC BLOOD PRESSURE: 70 MMHG | BODY MASS INDEX: 33.21 KG/M2 | HEART RATE: 68 BPM

## 2018-01-14 VITALS
HEIGHT: 69 IN | DIASTOLIC BLOOD PRESSURE: 82 MMHG | HEART RATE: 100 BPM | BODY MASS INDEX: 34.96 KG/M2 | WEIGHT: 236 LBS | SYSTOLIC BLOOD PRESSURE: 120 MMHG

## 2018-01-14 VITALS
OXYGEN SATURATION: 97 % | SYSTOLIC BLOOD PRESSURE: 126 MMHG | HEART RATE: 90 BPM | BODY MASS INDEX: 32.01 KG/M2 | DIASTOLIC BLOOD PRESSURE: 104 MMHG | WEIGHT: 223.56 LBS | HEIGHT: 70 IN

## 2018-01-17 NOTE — MISCELLANEOUS
Message   Recorded as Task   Date: 10/18/2017 10:01 AM, Created By: Lynnette Watson   Task Name: Follow Up   Assigned To: SPA es procedure,Team   Regarding Patient: Solomon Green, Status: Active   [facility] 18 Oct 2017 10:01 AM     TASK CREATED  S/p LESI #2 10/12  10/20 f/u ov   DavidthomasButtsCarline - 19 Oct 2017 2:06 PM     TASK EDITED  S/W pt states walking is better and is not limping as much but states sharp pain in lower left back/buttock is still there  States only 5% improvement thus far  Denies headache, fever of s/s of infection  Advised that max benefit could be up to 2 weeks for this injection  Pt verbalized understanding  F/U ov made for 11/13  Erin Turk - 64 Oct 2017 3:06 PM     TASK REPLIED TO: Previously Assigned To Erin mendez md aware        Active Problems    1  Abdominal pain, LLQ (left lower quadrant) (789 04) (R10 32)   2  BRBPR (bright red blood per rectum) (569 3) (K62 5)   3  Cervical pain (723 1) (M54 2)   4  Cervical radiculopathy (723 4) (M54 12)   5  Left shoulder pain (719 41) (M25 512)   6  Lumbar radiculopathy (724 4) (M54 16)   7  Post concussion syndrome (310 2) (F07 81)   8  Syncope (780 2) (R55)    Current Meds   1  Cyclobenzaprine HCl - 10 MG Oral Tablet; TAKE 1 TABLET AT BEDTIME AS NEEDED; Therapy: 59CDC6939 to (TZYHQJLT:90QFM8611)  Requested for: 68CVN2734; Last   Rx:06Oct2017 Ordered    Allergies    1  No Known Drug Allergies    2  No Known Environmental Allergies   3   No Known Food Allergies    Signatures   Electronically signed by : Dionne Mcconnell, ; Oct 19 2017  3:25PM EST                       (Author)

## 2018-01-18 NOTE — MISCELLANEOUS
Message   Recorded as Task   Date: 2017 11:45 AM, Created By: Dwayne Anderson   Task Name: Follow Up   Assigned To: SPA es procedure,Team   Regarding Patient: Maddie Rea, Status: Active   Comment:    Dwayne Anderson - 28 Sep 2017 11:45 AM     TASK CREATED  S/p L L5 TFESI   F/u ov 10/20  LM with wife with cb #  KingKacy - 28 Sep 2017 11:45 AM     TASK IN PROGRESS   Freida Piña - 29 Sep 2017 4:07 PM     TASK EDITED  Given to:             [facility] [employer]     Reason: ROUTINE/OFFICE   Pt's Dr: Radhames        For: OFFICE     2nd Call: NO        From: Edna Erich     Phone: [phone]   Ext:     Pt Name: Jodelle Nageotte    Pt : 1985     Message: Rick Doran CALL FROM DR HILARY SAM/REPORTS THERE IS STILL            PAIN/HAS WORSENED SLIGHTLY  -----------------------------------------------------------------     CALLER ID: [id number]      CSN: [id number]      Taken By: IGOR 2017 03:50 PM  Delivered By: GAYLE 2017 04:00 PM  -----------------------------------------------------------------  2017 03:52 PM    IGOR    ,[id number]   Kacy Ortez - 02 Oct 2017 1:20 PM     TASK IN PROGRESS   Kacy Ortez - 02 Oct 2017 1:20 PM     TASK EDITED  lmom to cb  f/u ov 10/20   Kacy Ortez - 02 Oct 2017 1:21 PM     TASK EDITED  error in last entry  Mailbox was full  Unable to leave message  Ashley Whitman - 02 Oct 2017 1:31 PM     TASK EDITED  Pt returned call stating he had no relief and it is slightly worse  Pt can be reached at [phone]  Kacy Ortez - 53 Oct 2017 1:35 PM     TASK EDITED  L L5 TFESI   Reports that pain has increased since injection  Has ov 10/20  Please advise  [facility] - 50 Oct 2017 2:09 PM     TASK REPLIED TO: Previously Assigned To Pavan Flynn  pls schedule OV f/u   Kacy Ortez - 02 Oct 2017 4:24 PM     TASK EDITED  Can we reschedule pt for earlier ov?    Stefan Griffins - 05 Oct 2017 1:52 PM     TASK REPLIED TO: Previously Assigned To Echo Sun  F-UP SCHED FOR 10/6/17        Active Problems    1  Abdominal pain, LLQ (left lower quadrant) (789 04) (R10 32)   2  BRBPR (bright red blood per rectum) (569 3) (K62 5)   3  Cervical pain (723 1) (M54 2)   4  Cervical radiculopathy (723 4) (M54 12)   5  Left shoulder pain (719 41) (M25 512)   6  Lumbar radiculopathy (724 4) (M54 16)   7  Post concussion syndrome (310 2) (F07 81)   8  Syncope (780 2) (R55)    Current Meds   1  No Reported Medications  Requested for: 20Sep2017 Recorded    Allergies    1  No Known Drug Allergies    2  No Known Environmental Allergies   3   No Known Food Allergies    Signatures   Electronically signed by : Adam Donahue, ; Oct  5 2017  2:00PM EST                       (Author)

## 2018-01-18 NOTE — PROCEDURES
Results/Data    Procedure: Electromyogram and Nerve Conduction Study  Technique: A sterile concentric needle electrode was used  There were no complications  Results : Motor and sensory nerve conduction studies were performed on the bilateral upper extremities  Bilateral median and ulnar compound motor action potentials were within normal limits the bilateral median and ulnar FA latencies were within normal limits  The bilateral median and ulnar sensory action potentials were within normal limits  Concentric needle examination was performed on various proximal and distal muscles including deltoid, biceps, triceps, pronator teres, APB, FDI and low cervical paraspinals  There was no evidence of active denervation in  any of the muscles tested  Mild decreased recruitment of giant motor units was noted in the left biceps and pronator teres  The compound motor unit action potentials were of normal configuration with interference patterns being full or full for effort in the remaining muscles tested  Interpretation: This [place] physiologic evidence of a :    1  Chronic C6 radiculopathy on the left as evidenced by the decreased recruitment and chronic denervation changes in the above-mentioned muscles  Clinical and imaging correlation of the cervical spine is suggested        Signatures   Electronically signed by : Cindy Chappell MD; Jul 10 2017 11:55AM EST                       (Author)

## 2018-01-22 VITALS
HEART RATE: 76 BPM | HEIGHT: 70 IN | WEIGHT: 234 LBS | DIASTOLIC BLOOD PRESSURE: 88 MMHG | RESPIRATION RATE: 12 BRPM | BODY MASS INDEX: 33.5 KG/M2 | SYSTOLIC BLOOD PRESSURE: 122 MMHG

## 2018-01-22 VITALS
HEIGHT: 70 IN | SYSTOLIC BLOOD PRESSURE: 118 MMHG | HEART RATE: 76 BPM | RESPIRATION RATE: 14 BRPM | BODY MASS INDEX: 33.5 KG/M2 | WEIGHT: 234 LBS | DIASTOLIC BLOOD PRESSURE: 80 MMHG

## 2018-01-23 NOTE — RESULT NOTES
Message   Recorded as Task   Date: 12/18/2017 11:59 AM, Created By: Radha Miles   Task Name: Follow Up   Assigned To: SPA es procedure,Team   Regarding Patient: Kim Parks, Status: Active   CommentAmos Plaster - 18 Dec 2017 11:59 AM     TASK CREATED  S/p FABIENNE 12/12   Echo Bishop - 19 Dec 2017 9:55 AM     TASK EDITED  S/w pt states that his current relief is 5% and pain level is 5/10 following his procedure  This nurse explained to the pt that it could up to 2weeks to get the full effect of the steroid     Emily Mar - 68 Dec 2017 11:23 AM     TASK REPLIED TO: Previously Assigned To Pavan Flynn pls F/U   Kacy Ortez - 19 Dec 2017 11:33 AM     TASK REASSIGNED: Previously Assigned To SPA es procedure,Team   Echo Sun - 02 Jan 2018 9:03 AM     TASK REPLIED TO: Previously Assigned To Hellen Wiley  f-up schedulded for 1/24/18        Signatures   Electronically signed by : Gabino Montes, ; Jan 2 2018  9:58AM EST                       (Author)

## 2018-01-24 VITALS
HEIGHT: 70 IN | SYSTOLIC BLOOD PRESSURE: 128 MMHG | RESPIRATION RATE: 16 BRPM | BODY MASS INDEX: 32.93 KG/M2 | WEIGHT: 230 LBS | HEART RATE: 74 BPM | DIASTOLIC BLOOD PRESSURE: 94 MMHG

## 2018-03-07 NOTE — PROCEDURES
Procedure    Pre-procedure Diagnosis: 1  Lumbar Spinal Stenosis       2  Lumbar Radiculopathy   Post-procedure Diagnosis: 1  Lumbar Spinal Stenosis      2  Lumbar Radiculopathy   Procedure Title(s):  1  Lumbar Epidural Steroid Injection      2  Intraoperative fluoroscopy  Attending Surgeon:   Gayle Saunders MD  Anesthesia:   Local    Procedure Note:   The patient's history and physical exam were reviewed  I explained the details of the procedure to the patient including the risks, benefits and other alternatives  We had an informed discussion and the patient verbalized understanding and signed the consent form  All questions were answered appropriately  The patient was taken to the Fluoroscopy Suite and placed prone on the table with support and arms down at the sides  Prior to the procedure, a "time out" was performed  The lower back was prepped times three with betadine and sterilely draped  Fluoroscopic examination of the lumbar spine was performed  The skin was anesthetized with 3cc's of 1% Lidocaine in the midline of the Interspace  Using a loss of resistance technique with a 3 5 inch, 20-gauge Tuohy needle, the epidural space was identified without complication  Needle placement was also confirmed under fluoroscopy  After negative aspiration and administration of 1cc's of omnipaque, 80mg depomedrol and 3cc's of preservative free saline was injected in small boluses without complication  The needle was withdrawn with 1cc of 1% lidocaine flush  The area was cleansed, and a band-aid applied  Disposition: The patient tolerated the procedure well, and there were no apparent complications  The patient was taken to the recovery area where written discharge instructions for the procedure were given          Signatures   Electronically signed by : Gayle Saunders MD; Oct 17 2017  1:12PM EST                       (Author)

## 2018-03-07 NOTE — PROCEDURES
Procedure    Pre-procedure Diagnosis: 1  Lumbar Spinal Stenosis       2  Lumbar Radiculopathy   Post-procedure Diagnosis: 1  Lumbar Spinal Stenosis      2  Lumbar Radiculopathy   Procedure Title(s):  1  Lumbar Epidural Steroid Injection      2  Intraoperative fluoroscopy  Attending Surgeon:   Gómez Seaman MD  Anesthesia:   Local    Procedure Note:   The patient's history and physical exam were reviewed  I explained the details of the procedure to the patient including the risks, benefits and other alternatives  We had an informed discussion and the patient verbalized understanding and signed the consent form  All questions were answered appropriately  The patient was taken to the Fluoroscopy Suite and placed prone on the table with support and arms down at the sides  Prior to the procedure, a "time out" was performed  The lower back was prepped times three with betadine and sterilely draped  Fluoroscopic examination of the lumbar spine was performed  The skin was anesthetized with 3cc's of 1% Lidocaine in the midline of the Interspace  Using a loss of resistance technique with a 3 5 inch, 20-gauge Tuohy needle, the epidural space was identified without complication  Needle placement was also confirmed under fluoroscopy  After negative aspiration and administration of 1cc's of omnipaque, 80mg depomedrol and 3cc's of preservative free saline was injected in small boluses without complication  The needle was withdrawn with 1cc of 1% lidocaine flush  The area was cleansed, and a band-aid applied  Disposition: The patient tolerated the procedure well, and there were no apparent complications  The patient was taken to the recovery area where written discharge instructions for the procedure were given          Signatures   Electronically signed by : Gómez Seaman MD; Dec 12 2017  5:38PM EST                       (Author)

## 2018-05-10 ENCOUNTER — OFFICE VISIT (OUTPATIENT)
Dept: NEUROLOGY | Facility: CLINIC | Age: 33
End: 2018-05-10
Payer: OTHER MISCELLANEOUS

## 2018-05-10 VITALS
WEIGHT: 237 LBS | SYSTOLIC BLOOD PRESSURE: 118 MMHG | HEART RATE: 90 BPM | BODY MASS INDEX: 33.93 KG/M2 | DIASTOLIC BLOOD PRESSURE: 80 MMHG | HEIGHT: 70 IN

## 2018-05-10 DIAGNOSIS — G44.309 POST-TRAUMATIC HEADACHE, NOT INTRACTABLE, UNSPECIFIED CHRONICITY PATTERN: ICD-10-CM

## 2018-05-10 DIAGNOSIS — M54.16 LUMBAR RADICULOPATHY: ICD-10-CM

## 2018-05-10 DIAGNOSIS — M54.12 CERVICAL RADICULOPATHY: Primary | ICD-10-CM

## 2018-05-10 PROBLEM — M54.2 CERVICAL PAIN: Status: ACTIVE | Noted: 2017-07-06

## 2018-05-10 PROCEDURE — 99214 OFFICE O/P EST MOD 30 MIN: CPT | Performed by: PSYCHIATRY & NEUROLOGY

## 2018-05-10 RX ORDER — GABAPENTIN 300 MG/1
600 CAPSULE ORAL 3 TIMES DAILY
Refills: 0 | COMMUNITY
Start: 2018-04-23

## 2018-05-10 RX ORDER — MELOXICAM 15 MG/1
15 TABLET ORAL DAILY
Refills: 0 | COMMUNITY
Start: 2018-02-27

## 2018-05-10 RX ORDER — ALPRAZOLAM 0.5 MG/1
TABLET ORAL
Refills: 0 | COMMUNITY
Start: 2018-04-17 | End: 2018-09-11 | Stop reason: HOSPADM

## 2018-05-10 RX ORDER — TRAMADOL HYDROCHLORIDE 50 MG/1
50 TABLET ORAL DAILY
Refills: 0 | COMMUNITY
Start: 2018-04-17

## 2018-05-10 RX ORDER — ACETAMINOPHEN, ASPIRIN AND CAFFEINE 250; 250; 65 MG/1; MG/1; MG/1
2 TABLET, FILM COATED ORAL DAILY PRN
COMMUNITY
End: 2018-09-11 | Stop reason: HOSPADM

## 2018-05-10 RX ORDER — CYCLOBENZAPRINE HCL 10 MG
1 TABLET ORAL
COMMUNITY
Start: 2017-10-06 | End: 2018-09-11 | Stop reason: HOSPADM

## 2018-05-10 NOTE — PROGRESS NOTES
Nickolas Romano is a 34 y o  male  Chief Complaint   Patient presents with    Headache    Neck Pain    Back Pain       Assessment:  1  Cervical radiculopathy    2  Lumbar radiculopathy    3  Post-traumatic headache, not intractable, unspecified chronicity pattern        Plan:    Discussion:  Patient is overall doing well, still has slight low back pain and left scapular pain, he is in follow up with Dr Soha Bradford and his pain specialist, he was advised to avoid strenuous activity, his headaches are resolved, to go to the hospital if has any worsening symptoms and call me otherwise to see me back in 3-4 months and follow up with family physician  Subjective:    HPI   Patient is here in follow-up status post fall for his headaches neck pain and low back pain, since his last visit  he is doing good, his headaches have resolved, his neck and low back pain is at least 70 percent better, he still has some low back pain on and off and has slight left-sided scapular pain without any radiation into the arms, no bowel and bladder incontinence, no focal weakness, no vision difficulty, no speech difficulty, no other neurological complaints      Vitals:    05/10/18 1244   BP: 118/80   BP Location: Left arm   Patient Position: Sitting   Cuff Size: Adult   Pulse: 90   Weight: 108 kg (237 lb)   Height: 5' 10" (1 778 m)       Current Medications    Current Outpatient Prescriptions:     ALPRAZolam (XANAX) 0 5 mg tablet, TAKE 1 TABLET BY MOUTH 1 HOUR PRIOR TO APPOINTMENT, Disp: , Rfl: 0    gabapentin (NEURONTIN) 300 mg capsule, Take 1 capsule by mouth 3 (three) times a day, Disp: , Rfl: 0    ondansetron (ZOFRAN-ODT) 4 mg disintegrating tablet, Take 1 tablet by mouth every 8 (eight) hours as needed for nausea or vomiting, Disp: 20 tablet, Rfl: 0    acetaminophen (TYLENOL) 325 mg tablet, Take 650 mg by mouth every 6 (six) hours as needed for mild pain, Disp: , Rfl:     aspirin-acetaminophen-caffeine (EXCEDRIN EXTRA STRENGTH) 250-250-65 MG per tablet, Take 2 capsules by mouth daily as needed, Disp: , Rfl:     butalbital-acetaminophen-caffeine (FIORICET,ESGIC) -40 mg per tablet, Take 1 tablet by mouth every 6 (six) hours as needed for headaches, Disp: 10 tablet, Rfl: 0    cyclobenzaprine (FLEXERIL) 10 mg tablet, Take 1 tablet by mouth, Disp: , Rfl:     meloxicam (MOBIC) 15 mg tablet, Take 15 mg by mouth daily, Disp: , Rfl: 0    SUMAtriptan (IMITREX) 100 mg tablet, Take 1 tablet by mouth once as needed for migraine (for migraine) for up to 7 days Take one with migraine-can repeat x 1 dose after 2 hours; only 2 doses/day, Disp: 10 tablet, Rfl: 0    traMADol (ULTRAM) 50 mg tablet, Take 50 mg by mouth daily, Disp: , Rfl: 0      Allergies  Patient has no known allergies  Past Medical History  Past Medical History:   Diagnosis Date    Back injury     Concussion     Head injury     Work related injury 06/14/2017         Past Surgical History:  Past Surgical History:   Procedure Laterality Date    FOOT SURGERY Left          Family History:  Family History   Problem Relation Age of Onset    No Known Problems Mother     Heart attack Father     Stroke Father     Hypertension Father        Social History:   reports that he has never smoked  He has never used smokeless tobacco  He reports that he drinks alcohol  He reports that he does not use drugs  I have reviewed the past medical history, surgical history, social and family history, current medications, allergies vitals, review of systems, and updated this information as appropriate today  Objective:    Physical Exam    Neurological Exam    GENERAL:  Cooperative in no acute distress  Well-developed and well-nourished    HEAD and NECK   Head is atraumatic normocephalic with no lesions or masses  Neck is supple with full range of motion    CARDIOVASCULAR  Carotid Arteries-no carotid bruits      NEUROLOGIC:  Mental Status-the patient is awake alert and oriented without aphasia or apraxia  Cranial Nerves: Visual fields are full to confrontation  Extraocular movements are full without nystagmus  Pupils are 2-1/2 mm and reactive  Face is symmetrical to light touch  Movements of facial expression move symmetrically  Hearing is normal to finger rub bilaterally  Soft palate lifts symmetrically  Shoulder shrug is symmetrical  Tongue is midline without atrophy  Motor: No drift is noted on arm extension  Strength is full in the upper and lower extremities with normal bulk and tone  Sensory: Intact to temperature and vibratory sensation in the upper and lower extremities bilaterally  Cortical function is intact  Coordination: Finger to nose testing is performed accurately  Gait reveals a normal base with symmetrical arm swing  Slight paraspinal muscle tenderness in the cervical and lumbar area  ROS:  Review of Systems   Constitutional: Negative for appetite change, fatigue and fever  HENT: Negative for facial swelling, hearing loss, tinnitus and trouble swallowing  Eyes: Negative for pain and visual disturbance  Respiratory: Negative for shortness of breath  Gastrointestinal: Negative for abdominal pain, constipation and diarrhea  Genitourinary: Negative for difficulty urinating  Musculoskeletal: Positive for back pain and neck pain  Negative for gait problem  Skin: Negative  Neurological: Negative for tremors, seizures, facial asymmetry, speech difficulty, weakness, light-headedness, numbness and headaches  Psychiatric/Behavioral: Positive for decreased concentration and sleep disturbance

## 2018-09-06 RX ORDER — LIDOCAINE 40 MG/G
CREAM TOPICAL
Refills: 2 | COMMUNITY
Start: 2018-06-20

## 2018-09-11 ENCOUNTER — OFFICE VISIT (OUTPATIENT)
Dept: NEUROLOGY | Facility: CLINIC | Age: 33
End: 2018-09-11
Payer: OTHER MISCELLANEOUS

## 2018-09-11 VITALS
DIASTOLIC BLOOD PRESSURE: 86 MMHG | HEART RATE: 80 BPM | BODY MASS INDEX: 33.07 KG/M2 | SYSTOLIC BLOOD PRESSURE: 118 MMHG | WEIGHT: 231 LBS | HEIGHT: 70 IN

## 2018-09-11 DIAGNOSIS — M54.16 LUMBAR RADICULOPATHY: ICD-10-CM

## 2018-09-11 DIAGNOSIS — M54.12 CERVICAL RADICULOPATHY: Primary | ICD-10-CM

## 2018-09-11 PROCEDURE — 99214 OFFICE O/P EST MOD 30 MIN: CPT | Performed by: PSYCHIATRY & NEUROLOGY

## 2018-09-11 NOTE — PROGRESS NOTES
Shay Kelley is a 34 y o  male  Chief Complaint   Patient presents with    Headache    Neck Pain    Back Pain       Assessment:  1  Cervical radiculopathy    2  Lumbar radiculopathy        Plan:    Discussion:  Patient is overall doing well, still has slight neck and low back pain and is in follow up with the pain specialist, he was advised to avoid strenuous activity, his headaches have resolved, to go to the hospital if has any worsening symptoms and call me, follow up with his other physicians and see me back in 3 months  Subjective:    HPI   Patient is here in follow-up status post fall for his headaches neck pain and low back pain, since his last visit his headaches have resolved, he had radiofrequency ablation for his lower back and feels the neck pain in the low back pain is much better but still continues to have mild pain about 3 on 10 without any radiation into the arms or the legs, no bowel and bladder incontinence, no focal weakness, no vision or speech difficulty, no other neurological complaints  Vitals:    09/11/18 0942   BP: 118/86   Pulse: 80   Weight: 105 kg (231 lb)   Height: 5' 10" (1 778 m)       Current Medications    Current Outpatient Prescriptions:     acetaminophen (TYLENOL) 325 mg tablet, Take 650 mg by mouth every 6 (six) hours as needed for mild pain, Disp: , Rfl:     diclofenac sodium (VOLTAREN) 1 %, 2 g Every 6 hours, Disp: , Rfl:     gabapentin (NEURONTIN) 300 mg capsule, Take 600 mg by mouth 3 (three) times a day  , Disp: , Rfl: 0    lidocaine (LMX) 4 % cream, APPLY TO AFFECTED AREA 3 TIMES A DAY, Disp: , Rfl: 2    meloxicam (MOBIC) 15 mg tablet, Take 15 mg by mouth daily, Disp: , Rfl: 0    traMADol (ULTRAM) 50 mg tablet, Take 50 mg by mouth daily, Disp: , Rfl: 0      Allergies  Patient has no known allergies      Past Medical History  Past Medical History:   Diagnosis Date    Back injury     Cervical radiculopathy     Concussion     Head injury     Lumbar radiculopathy     Post-traumatic headache, unspecified, not intractable     Work related injury 06/14/2017         Past Surgical History:  Past Surgical History:   Procedure Laterality Date    FOOT SURGERY Left          Family History:  Family History   Problem Relation Age of Onset    No Known Problems Mother     Heart attack Father     Stroke Father     Hypertension Father        Social History:   reports that he has never smoked  He has never used smokeless tobacco  He reports that he drinks alcohol  He reports that he does not use drugs  I have reviewed the past medical history, surgical history, social and family history, current medications, allergies vitals, review of systems, and updated this information as appropriate today  Objective:    Physical Exam    Neurological Exam    GENERAL:  Cooperative in no acute distress  Well-developed and well-nourished    HEAD and NECK   Head is atraumatic normocephalic with no lesions or masses  Neck is supple with full range of motion    CARDIOVASCULAR  Carotid Arteries-no carotid bruits  NEUROLOGIC:  Mental Status-the patient is awake alert and oriented without aphasia or apraxia  Cranial Nerves: Visual fields are full to confrontation    Extraocular movements are full without nystagmus  Pupils are 2-1/2 mm and reactive  Face is symmetrical to light touch  Movements of facial expression move symmetrically  Hearing is normal to finger rub bilaterally  Soft palate lifts symmetrically  Shoulder shrug is symmetrical  Tongue is midline without atrophy  Motor: No drift is noted on arm extension  Strength is full in the upper and lower extremities with normal bulk and tone  Sensory: Intact to temperature and vibratory sensation in the upper and lower extremities bilaterally  Cortical function is intact  Coordination: Finger to nose testing is performed accurately  Gait reveals a normal base with symmetrical arm swing     Paraspinal muscle tenderness in the cervical and lumbar area          ROS:  Review of Systems   Constitutional: Negative  HENT: Negative  Eyes: Negative  Respiratory: Negative  Cardiovascular: Negative  Gastrointestinal: Negative  Endocrine: Negative  Genitourinary: Negative  Musculoskeletal: Positive for back pain and neck pain  Skin: Negative  Allergic/Immunologic: Negative  Neurological: Positive for headaches  Hematological: Negative  Psychiatric/Behavioral: Negative

## 2019-04-22 ENCOUNTER — TRANSCRIBE ORDERS (OUTPATIENT)
Dept: RADIOLOGY | Facility: CLINIC | Age: 34
End: 2019-04-22

## 2019-04-22 ENCOUNTER — TRANSCRIBE ORDERS (OUTPATIENT)
Dept: OCCUPATIONAL MEDICINE | Facility: CLINIC | Age: 34
End: 2019-04-22

## 2019-04-22 ENCOUNTER — APPOINTMENT (OUTPATIENT)
Dept: RADIOLOGY | Facility: CLINIC | Age: 34
End: 2019-04-22
Payer: OTHER MISCELLANEOUS

## 2019-04-22 ENCOUNTER — APPOINTMENT (OUTPATIENT)
Dept: OCCUPATIONAL MEDICINE | Facility: CLINIC | Age: 34
End: 2019-04-22
Payer: OTHER MISCELLANEOUS

## 2019-04-22 DIAGNOSIS — R52 PAIN: Primary | ICD-10-CM

## 2019-04-22 DIAGNOSIS — R52 PAIN: ICD-10-CM

## 2019-04-22 PROCEDURE — 99213 OFFICE O/P EST LOW 20 MIN: CPT | Performed by: PREVENTIVE MEDICINE

## 2019-04-22 PROCEDURE — 73130 X-RAY EXAM OF HAND: CPT

## 2019-04-29 ENCOUNTER — APPOINTMENT (OUTPATIENT)
Dept: OCCUPATIONAL MEDICINE | Facility: CLINIC | Age: 34
End: 2019-04-29
Payer: OTHER MISCELLANEOUS

## 2019-04-29 PROCEDURE — 99213 OFFICE O/P EST LOW 20 MIN: CPT | Performed by: PREVENTIVE MEDICINE

## 2019-05-06 ENCOUNTER — APPOINTMENT (OUTPATIENT)
Dept: OCCUPATIONAL MEDICINE | Facility: CLINIC | Age: 34
End: 2019-05-06
Payer: OTHER MISCELLANEOUS

## 2019-05-06 PROCEDURE — 99213 OFFICE O/P EST LOW 20 MIN: CPT

## 2024-01-12 ENCOUNTER — OFFICE VISIT (OUTPATIENT)
Age: 39
End: 2024-01-12
Payer: COMMERCIAL

## 2024-01-12 VITALS
DIASTOLIC BLOOD PRESSURE: 82 MMHG | SYSTOLIC BLOOD PRESSURE: 121 MMHG | WEIGHT: 232 LBS | HEART RATE: 91 BPM | RESPIRATION RATE: 18 BRPM | BODY MASS INDEX: 33.21 KG/M2 | TEMPERATURE: 97.9 F | HEIGHT: 70 IN | OXYGEN SATURATION: 97 %

## 2024-01-12 DIAGNOSIS — J02.9 SORE THROAT: ICD-10-CM

## 2024-01-12 DIAGNOSIS — J02.9 ACUTE PHARYNGITIS, UNSPECIFIED ETIOLOGY: Primary | ICD-10-CM

## 2024-01-12 PROBLEM — M25.512 LEFT SHOULDER PAIN: Status: ACTIVE | Noted: 2017-09-20

## 2024-01-12 PROBLEM — M46.1 INFLAMMATION OF SACROILIAC JOINT (HCC): Status: ACTIVE | Noted: 2024-01-12

## 2024-01-12 PROBLEM — D62 ACUTE BLOOD LOSS ANEMIA: Status: ACTIVE | Noted: 2018-12-26

## 2024-01-12 PROBLEM — R73.9 HYPERGLYCEMIA: Status: ACTIVE | Noted: 2019-01-03

## 2024-01-12 PROBLEM — G47.33 OSA (OBSTRUCTIVE SLEEP APNEA): Status: ACTIVE | Noted: 2018-12-20

## 2024-01-12 PROBLEM — F07.81 POST CONCUSSION SYNDROME: Status: ACTIVE | Noted: 2017-07-06

## 2024-01-12 PROBLEM — M54.6 THORACIC BACK PAIN: Status: ACTIVE | Noted: 2024-01-12

## 2024-01-12 PROBLEM — E78.2 MIXED HYPERLIPIDEMIA: Status: ACTIVE | Noted: 2020-12-04

## 2024-01-12 PROBLEM — R80.1 PERSISTENT PROTEINURIA: Status: ACTIVE | Noted: 2018-12-26

## 2024-01-12 PROBLEM — K64.8 OTHER HEMORRHOIDS: Status: ACTIVE | Noted: 2019-12-05

## 2024-01-12 PROBLEM — M54.50 LOW BACK PAIN: Status: ACTIVE | Noted: 2017-06-14

## 2024-01-12 PROBLEM — J32.4 CHRONIC PANSINUSITIS: Status: ACTIVE | Noted: 2018-12-20

## 2024-01-12 LAB — S PYO AG THROAT QL: NEGATIVE

## 2024-01-12 PROCEDURE — 87147 CULTURE TYPE IMMUNOLOGIC: CPT

## 2024-01-12 PROCEDURE — 99213 OFFICE O/P EST LOW 20 MIN: CPT

## 2024-01-12 PROCEDURE — 87070 CULTURE OTHR SPECIMN AEROBIC: CPT

## 2024-01-12 PROCEDURE — S9088 SERVICES PROVIDED IN URGENT: HCPCS

## 2024-01-12 RX ORDER — LISINOPRIL 10 MG/1
TABLET ORAL
COMMUNITY

## 2024-01-12 RX ORDER — LORATADINE 10 MG/1
TABLET ORAL
COMMUNITY

## 2024-01-12 RX ORDER — FLUTICASONE PROPIONATE 50 MCG
1 SPRAY, SUSPENSION (ML) NASAL DAILY
COMMUNITY

## 2024-01-12 RX ORDER — LIDOCAINE 5 G/100G
CREAM RECTAL; TOPICAL
COMMUNITY

## 2024-01-12 NOTE — PROGRESS NOTES
[facility]        NAME: Mateo Isaac is a 38 y.o. male  : 1985    MRN: [id number]  DATE: 2024  TIME: 11:11 AM    Assessment and Plan   Acute pharyngitis, unspecified etiology [J02.9]  1. Acute pharyngitis, unspecified etiology        2. Sore throat  POCT rapid strepA    Throat culture        Rapid Strep negative, will sent throat culture and follow-up if positive. Patient declined COVID/flu testing. Suspect viral illness given clinical presentation. VSS in clinic, appears in no acute distress. Educated on use of OTC products for symptoms. Advised close follow-up with PCP or to report to the ER if symptoms worsen. Patient verbalizes understanding and agreeable to plan.       Patient Instructions     Symptoms of a viral infection may linger for up to 10 days. Your contagious period is the first 5-7 days of symptom onset. Continue over-the-counter products for symptoms: tylenol for fevers, ibuprofen for body aches, flonase (fluticasone) with nasal saline and sudafed for nasal congestion, robitussin for cough, and airborne/emergen-c for vitamin supplementation. Follow-up with PCP in 3-5 days if no improvement of symptoms. Report to ER if symptoms worsen.      Chief Complaint     Chief Complaint   Patient presents with    Sore Throat     Started a day ago, patient complains of sore throat, ear pressure, and trouble swallowing.          History of Present Illness       38 year old male presents for evaluation of sore throat, ear fullness, and congestion that started yesterday. He denies any known sick contacts or triggers. He denies prior history of allergies or asthma. He is not UTD on COVID or flu vaccines for this season. He reports a dry cough but denies fevers or shortness of breath. He has not tried any interventions for symptoms.     Sore Throat   This is a new problem. The current episode started yesterday. The problem has been unchanged. The pain is worse on the right side. There  has been no fever. The pain is at a severity of 5/10. The pain is moderate. Associated symptoms include congestion, coughing, ear pain, headaches and a plugged ear sensation. Pertinent negatives include no abdominal pain, diarrhea, drooling, ear discharge, hoarse voice, neck pain, shortness of breath, stridor, swollen glands, trouble swallowing or vomiting. He has had no exposure to strep or mono. He has tried nothing for the symptoms. The treatment provided no relief.       Review of Systems   Review of Systems   Constitutional:  Positive for fever. Negative for activity change, appetite change, chills and fatigue.   HENT:  Positive for congestion, ear pain, postnasal drip, rhinorrhea, sinus pressure and sore throat. Negative for drooling, ear discharge, hoarse voice, sinus pain, sneezing and trouble swallowing.    Respiratory:  Positive for cough. Negative for chest tightness, shortness of breath and stridor.    Cardiovascular:  Negative for chest pain and palpitations.   Gastrointestinal:  Negative for abdominal pain, constipation, diarrhea, nausea and vomiting.   Musculoskeletal:  Negative for arthralgias, back pain, myalgias and neck pain.   Skin:  Negative for color change and pallor.   Allergic/Immunologic: Negative for environmental allergies and food allergies.   Neurological:  Positive for headaches. Negative for dizziness and light-headedness.         Current Medications       Current Outpatient Medications:     fluticasone (FLONASE) 50 mcg/act nasal spray, 1 spray into each nostril daily, Disp: , Rfl:     acetaminophen (TYLENOL) 325 mg tablet, Take 650 mg by mouth every 6 (six) hours as needed for mild pain (Patient not taking: Reported on 1/12/2024), Disp: , Rfl:     diclofenac sodium (VOLTAREN) 1 %, 2 g Every 6 hours (Patient not taking: Reported on 1/12/2024), Disp: , Rfl:     gabapentin (NEURONTIN) 300 mg capsule, Take 600 mg by mouth 3 (three) times a day   (Patient not taking: Reported on  "1/12/2024), Disp: , Rfl: 0    lidocaine (LMX) 4 % cream, APPLY TO AFFECTED AREA 3 TIMES A DAY (Patient not taking: Reported on 1/12/2024), Disp: , Rfl: 2    Lidocaine, Anorectal, 5 % CREA, Every 6 hours, Disp: , Rfl:     lisinopril (ZESTRIL) 10 mg tablet, lisinopril 10 mg tablet, Disp: , Rfl:     loratadine (CLARITIN) 10 mg tablet, loratadine 10 mg tablet, Disp: , Rfl:     meloxicam (MOBIC) 15 mg tablet, Take 15 mg by mouth daily (Patient not taking: Reported on 1/12/2024), Disp: , Rfl: 0    traMADol (ULTRAM) 50 mg tablet, Take 50 mg by mouth daily (Patient not taking: Reported on 1/12/2024), Disp: , Rfl: 0    Current Allergies     Allergies as of 01/12/2024    (No Known Allergies)            The following portions of the patient's history were reviewed and updated as appropriate: allergies, current medications, past family history, past medical history, past social history, past surgical history and problem list.     Past Medical History:   Diagnosis Date    Back injury     Cervical radiculopathy     Concussion     Head injury     Lumbar radiculopathy     Post-traumatic headache, unspecified, not intractable     Work related injury 06/14/2017       Past Surgical History:   Procedure Laterality Date    FOOT SURGERY Left        Family History   Problem Relation Age of Onset    No Known Problems Mother     Heart attack Father     Stroke Father     Hypertension Father          Medications have been verified.        Objective   /82   Pulse 91   Temp 97.9 °F (36.6 °C)   Resp 18   Ht 5' 10\" (1.778 m)   Wt 105 kg (232 lb)   SpO2 97%   BMI 33.29 kg/m²        Physical Exam     Physical Exam  Vitals and nursing note reviewed.   Constitutional:       General: He is awake. He is not in acute distress.     Appearance: Normal appearance. He is well-developed and normal weight.   HENT:      Head: Normocephalic and atraumatic.      Right Ear: Hearing, ear canal and external ear normal. A middle ear effusion is present. "      Left Ear: Hearing, ear canal and external ear normal. A middle ear effusion is present.      Nose: Congestion and rhinorrhea present. Rhinorrhea is clear.      Right Turbinates: Enlarged. Not swollen or pale.      Left Turbinates: Enlarged. Not swollen or pale.      Right Sinus: No maxillary sinus tenderness or frontal sinus tenderness.      Left Sinus: No maxillary sinus tenderness or frontal sinus tenderness.      Mouth/Throat:      Lips: Pink.      Mouth: Mucous membranes are moist.      Pharynx: Oropharynx is clear. Uvula midline. Posterior oropharyngeal erythema present. No pharyngeal swelling, oropharyngeal exudate or uvula swelling.      Tonsils: No tonsillar exudate or tonsillar abscesses. 2+ on the right. 2+ on the left.   Eyes:      Conjunctiva/sclera: Conjunctivae normal.      Pupils: Pupils are equal, round, and reactive to light.   Cardiovascular:      Rate and Rhythm: Normal rate and regular rhythm.      Pulses: Normal pulses.      Heart sounds: Normal heart sounds.   Pulmonary:      Effort: Pulmonary effort is normal.      Breath sounds: Normal breath sounds.   Musculoskeletal:      Cervical back: Full passive range of motion without pain, normal range of motion and neck supple.   Lymphadenopathy:      Head:      Right side of head: Preauricular and posterior auricular adenopathy present.      Cervical: Cervical adenopathy present.      Right cervical: Superficial cervical adenopathy present.   Skin:     General: Skin is warm and dry.   Neurological:      General: No focal deficit present.      Mental Status: He is alert and oriented to person, place, and time.   Psychiatric:         Mood and Affect: Mood normal.         Behavior: Behavior normal. Behavior is cooperative.         Thought Content: Thought content normal.         Judgment: Judgment normal.

## 2024-01-12 NOTE — PATIENT INSTRUCTIONS
Symptoms of a viral infection may linger for up to 10 days. Your contagious period is the first 5-7 days of symptom onset. Continue over-the-counter products for symptoms: tylenol for fevers, ibuprofen for body aches, flonase (fluticasone) with nasal saline and sudafed for nasal congestion, robitussin for cough, and airborne/emergen-c for vitamin supplementation. Follow-up with PCP in 3-5 days if no improvement of symptoms. Report to ER if symptoms worsen.

## 2024-01-14 LAB — BACTERIA THROAT CULT: ABNORMAL

## 2024-01-15 ENCOUNTER — TELEPHONE (OUTPATIENT)
Age: 39
End: 2024-01-15

## 2024-01-15 RX ORDER — AMOXICILLIN 500 MG/1
500 CAPSULE ORAL EVERY 12 HOURS SCHEDULED
Qty: 20 CAPSULE | Refills: 0 | Status: SHIPPED | OUTPATIENT
Start: 2024-01-15 | End: 2024-01-25

## 2024-01-15 NOTE — PROGRESS NOTES
Patient's friend  called clinic on patient's behalf. Verbal consent provided by patient over the phone. Discussed throat culture results with . Per friend, patient electing to start antibiotics for symptoms as they are not improving. 10-day course of Amoxicillin sent to preferred pharmacy. [employer] made aware.

## 2024-04-16 ENCOUNTER — TELEPHONE (OUTPATIENT)
Age: 39
End: 2024-04-16